# Patient Record
Sex: MALE
[De-identification: names, ages, dates, MRNs, and addresses within clinical notes are randomized per-mention and may not be internally consistent; named-entity substitution may affect disease eponyms.]

---

## 2020-06-08 ENCOUNTER — HOSPITAL ENCOUNTER (EMERGENCY)
Dept: HOSPITAL 5 - ED | Age: 50
Discharge: ANOTHER HOSPITAL | End: 2020-06-08
Payer: COMMERCIAL

## 2020-06-08 VITALS — SYSTOLIC BLOOD PRESSURE: 160 MMHG | DIASTOLIC BLOOD PRESSURE: 64 MMHG

## 2020-06-08 DIAGNOSIS — F17.200: ICD-10-CM

## 2020-06-08 DIAGNOSIS — I50.9: Primary | ICD-10-CM

## 2020-06-08 DIAGNOSIS — Z98.890: ICD-10-CM

## 2020-06-08 DIAGNOSIS — Z95.818: ICD-10-CM

## 2020-06-08 LAB
BASOPHILS # (AUTO): 0.1 K/MM3 (ref 0–0.1)
BASOPHILS NFR BLD AUTO: 1 % (ref 0–1.8)
BUN SERPL-MCNC: 12 MG/DL (ref 9–20)
BUN/CREAT SERPL: 12 %
CALCIUM SERPL-MCNC: 9.9 MG/DL (ref 8.4–10.2)
EOSINOPHIL # BLD AUTO: 0.1 K/MM3 (ref 0–0.4)
EOSINOPHIL NFR BLD AUTO: 1.1 % (ref 0–4.3)
HCT VFR BLD CALC: 46.3 % (ref 35.5–45.6)
HEMOLYSIS INDEX: 11
HGB BLD-MCNC: 14 GM/DL (ref 11.8–15.2)
INR PPP: 0.8 (ref 0.87–1.13)
LYMPHOCYTES # BLD AUTO: 1.5 K/MM3 (ref 1.2–5.4)
LYMPHOCYTES NFR BLD AUTO: 16.3 % (ref 13.4–35)
MCHC RBC AUTO-ENTMCNC: 30 % (ref 32–34)
MCV RBC AUTO: 64 FL (ref 84–94)
MONOCYTES # (AUTO): 0.8 K/MM3 (ref 0–0.8)
MONOCYTES % (AUTO): 9 % (ref 0–7.3)
PLATELET # BLD: 303 K/MM3 (ref 140–440)
RBC # BLD AUTO: 7.19 M/MM3 (ref 3.65–5.03)

## 2020-06-08 PROCEDURE — 96374 THER/PROPH/DIAG INJ IV PUSH: CPT

## 2020-06-08 PROCEDURE — 36415 COLL VENOUS BLD VENIPUNCTURE: CPT

## 2020-06-08 PROCEDURE — 93005 ELECTROCARDIOGRAM TRACING: CPT

## 2020-06-08 PROCEDURE — 96375 TX/PRO/DX INJ NEW DRUG ADDON: CPT

## 2020-06-08 PROCEDURE — 85610 PROTHROMBIN TIME: CPT

## 2020-06-08 PROCEDURE — 80048 BASIC METABOLIC PNL TOTAL CA: CPT

## 2020-06-08 PROCEDURE — 70450 CT HEAD/BRAIN W/O DYE: CPT

## 2020-06-08 PROCEDURE — 71045 X-RAY EXAM CHEST 1 VIEW: CPT

## 2020-06-08 PROCEDURE — 85025 COMPLETE CBC W/AUTO DIFF WBC: CPT

## 2020-06-08 PROCEDURE — 83735 ASSAY OF MAGNESIUM: CPT

## 2020-06-08 PROCEDURE — 83880 ASSAY OF NATRIURETIC PEPTIDE: CPT

## 2020-06-08 PROCEDURE — 82550 ASSAY OF CK (CPK): CPT

## 2020-06-08 PROCEDURE — 84484 ASSAY OF TROPONIN QUANT: CPT

## 2020-06-08 PROCEDURE — 99291 CRITICAL CARE FIRST HOUR: CPT

## 2020-06-08 NOTE — EMERGENCY DEPARTMENT REPORT
ED Chest Pain HPI





- General


Chief Complaint: Chest Pain


Stated Complaint: CHEST PAIN


PUI?: No


Time Seen by Provider: 20 10:41


Source: patient, RN notes reviewed


Mode of arrival: Stretcher


Limitations: Physical Limitation





- History of Present Illness


Initial Comments: 





Patient is a 49-year-old gentleman.  He is not known to myself previously.  He 

appears to have a history of obesity, hypertension, high cholesterol, he is c

urrently on Plavix, for unclear reasons, and also on Pradaxa, for unclear 

reasons.





He typically follows with the [facility].





He presents to the ER with a complaint of possible syncope/near syncope, and 

chest pressure/discomfort.





Patient states he has been compliant with his medications, he was in his usual 

state of health earlier on today, when he believes that he fell, or "fell out", 

he is not certain, denies headache and neck pain, describes nonspecific chest 

discomfort, denies DVT and pulmonary embolism risk factors, and has chronic 

lower extremity swelling.  He denies fever, loss of taste, loss of smell, and 

COVID symptomatology/COVID exposure.





He does take sildenifial, he believes that he took it a few weeks ago, but he is

not certain.





Apparently, emergency medical services were contacted, and they gave the patient

aspirin, and nitroglycerin.





He has significant anxiety, but he is basically having minimal pain at this 

time, rating his pain as a 3 out of 10 at this point in time.








MD Complaint: chest pain


-: Sudden, hour(s)


Onset: during exertion


Pain Location: substernal, left chest, right chest


Pain Radiation: none


Severity: moderate


Quality: other (Patient does not have a qualitative descriptor)


Consistency: now resolved (Chest discomfort is mostly resolved)


Improves With: nitroglycerin, rest


Worsens With: nothing (Nothing that the patient can recall)


Context: other (Patient was working on a lawnmower)


Treatments Prior to Arrival: aspirin, nitroglycerin


Aspirin use within the Past 7 Days: (0) No





- Related Data


On Oral Contraceptives: No


                                Home Medications











 Medication  Instructions  Recorded  Confirmed  Last Taken


 


AtorvaSTATin [Lipitor] 40 mg PO QHS 20 Unknown


 


Clopidogrel [Plavix] 75 mg PO QDAY 20 Unknown


 


Dabigatran Etexilate Mesylate 150 mg PO QDAY 20 Unknown





[Pradaxa]    


 


Famotidine [Pepcid] 20 mg PO BID 20 Unknown


 


Gabapentin [Neurontin] 300 mg PO QDAY 20 Unknown


 


Losartan [Cozaar] 25 mg PO QDAY 20 Unknown


 


Potassium Chloride [K-Dur] 10 meq PO QDAY 20 Unknown


 


Sertraline [Zoloft] 50 mg PO QDAY 20 Unknown


 


Sildenafil [Revatio] 20 mg PO QDAY 20 Unknown


 


Tamsulosin [Flomax] 0.4 mg PO QDAY 20 Unknown


 


Torsemide [Demadex] 20 mg PO BID 20 Unknown


 


amLODIPine [Norvasc] 5 mg PO DAILY 20 Unknown











                                    Allergies











Allergy/AdvReac Type Severity Reaction Status Date / Time


 


No Known Allergies Allergy   Unverified 20 10:37














Heart Score





- HEART Score


History: Moderately suspicious


EKG: Non-specific


Age: 45-65


Risk factors: > 3 risk factors or hx of atherosclerotic disease


Troponin: < normal limit


HEART Score: 5





- Critical Actions


Critical Actions: 4-6 pts:12-16.6% risk of adverse cardiac event. Should be 

admitted





ED Review of Systems


ROS: 


Stated complaint: CHEST PAIN


Other details as noted in HPI





Constitutional: malaise, weakness.  denies: fever


Eyes: denies: eye discharge


Respiratory: denies: cough


Cardiovascular: chest pain, edema, other


Gastrointestinal: denies: nausea, vomiting, diarrhea


Musculoskeletal: back pain (Patient reports chronic back)


Skin: denies: lesions


Neurological: denies: headache


Psychiatric: anxiety





ED Past Medical Hx





- Past Medical History


Previous Medical History?: Yes


Hx Congestive Heart Failure: Yes





- Surgical History


Hx Coronary Stent: Yes (x1)


Additional Surgical History: Brain aneruysm





- Social History


Smoking Status: Current Every Day Smoker


Substance Use Type: None





- Medications


Home Medications: 


                                Home Medications











 Medication  Instructions  Recorded  Confirmed  Last Taken  Type


 


AtorvaSTATin [Lipitor] 40 mg PO QHS 20 Unknown History


 


Clopidogrel [Plavix] 75 mg PO QDAY 20 Unknown History


 


Dabigatran Etexilate Mesylate 150 mg PO QDAY 20 Unknown History





[Pradaxa]     


 


Famotidine [Pepcid] 20 mg PO BID 20 Unknown History


 


Gabapentin [Neurontin] 300 mg PO QDAY 20 Unknown History


 


Losartan [Cozaar] 25 mg PO QDAY 20 Unknown History


 


Potassium Chloride [K-Dur] 10 meq PO QDAY 20 Unknown History


 


Sertraline [Zoloft] 50 mg PO QDAY 20 Unknown History


 


Sildenafil [Revatio] 20 mg PO QDAY 20 Unknown History


 


Tamsulosin [Flomax] 0.4 mg PO QDAY 20 Unknown History


 


Torsemide [Demadex] 20 mg PO BID 20 Unknown History


 


amLODIPine [Norvasc] 5 mg PO DAILY 20 Unknown History














ED Physical Exam





- General


Limitations: No Limitations


General appearance: alert, anxious, obese





- Head


Head exam: Present: atraumatic, normocephalic





- Eye


Eye exam: Present: normal appearance, EOMI.  Absent: nystagmus





- ENT


ENT exam: Present: normal exam, normal orophraynx, mucous membranes moist, 

normal external ear exam





- Neck


Neck exam: Present: normal inspection, full ROM.  Absent: tenderness, 

meningismus





- Respiratory


Respiratory exam: Present: rales, accessory muscle use.  Absent: respiratory 

distress, wheezes, rhonchi, stridor





- Cardiovascular


Cardiovascular Exam: Present: regular rate, normal rhythm, normal heart sounds, 

JVD.  Absent: bradycardia, tachycardia, irregular rhythm, systolic murmur, 

diastolic murmur, rubs, gallop





- GI/Abdominal


GI/Abdominal exam: Present: soft.  Absent: distended, tenderness, guarding, 

rebound, rigid, pulsatile mass





- Rectal


Rectal exam: Present: deferred





- Extremities Exam


Extremities exam: Present: normal inspection, full ROM, pedal edema (3+ edema 

noted on the bilateral lower extremity), other (2+ pulses noted in the bilateral

 upper and lower extremities.  There is no palpable cord.   negative Homans 

sign.  Muscular compartments are soft.  The pelvis is stable.).  Absent: calf 

tenderness





- Back Exam


Back exam: Present: normal inspection.  Absent: tenderness, CVA tenderness (R), 

CVA tenderness (L), paraspinal tenderness, vertebral tenderness





- Neurological Exam


Neurological exam: Present: alert, other (No facial droop.  Tongue midline.  

Extraocular movements intact bilaterally.  Facial sensation intact to light 

touch in V1, V2, V3 distribution bilaterally.  5 and a 5 strength in 4 

extremities.  Sensation intact to light touch in 4 extremities.).  Absent: motor

 sensory deficit





- Psychiatric


Psychiatric exam: Present: anxious





- Skin


Skin exam: Present: warm, dry, intact, normal color.  Absent: rash





ED Course


                                   Vital Signs











  20





  10:37 10:49 10:51


 


Temperature 98.3 F  


 


Pulse Rate  97 H 91 H


 


Respiratory  13 9 L





Rate   


 


Blood Pressure   144/89


 


O2 Sat by Pulse   96





Oximetry   














  20





  10:53 10:55 10:56


 


Temperature   


 


Pulse Rate 94 H 90 


 


Respiratory 13 20 12





Rate   


 


Blood Pressure 144/89 144/89 


 


O2 Sat by Pulse 96 93 92





Oximetry   














  20





  11:00 11:30 12:01


 


Temperature   


 


Pulse Rate 86 94 H 89


 


Respiratory 12 13 16





Rate   


 


Blood Pressure 158/74 174/96 173/50


 


O2 Sat by Pulse 95 94 96





Oximetry   














  20





  12:33 12:35 12:36


 


Temperature   


 


Pulse Rate 91 H  


 


Respiratory 12 13 13





Rate   


 


Blood Pressure 174/96  


 


O2 Sat by Pulse   





Oximetry   














- Reevaluation(s)


Reevaluation #1: 





20 12:12


Differential diagnosis, including but not limited to: Orthostasis, vagal event, 

structural cardiac disease, arrhythmia, acute coronary syndrome, congestive 

heart failure





Assessment and plan: 49-year-old gentleman, who is not currently tachycardic, 

with crackles, rales, hypoxia, lower extremity edema, my interpretation of the 

chest x-ray is consistent with congestive heart failure, I disagree with 

radiology interpretation.





Had nonspecific chest discomfort, and possible syncope/loss of consciousness.  

There is no headache or neck pain, and he has a GCS of 15.  Do not suspect acute

 aortic dissection at this time, given equal pulses in the upper and lower 

extremities,


An x-ray of the chest morphology.  His pain is minimal at this time.  

Nitroglycerin will be withheld given that we do not know if/when the patient has

 recently taken his phosphodiesterase inhibitor.  He has already been given 

aspirin.








He is given Lasix for acute volume overload.





I contacted [facility] coordinating physician, Dr. Marge Calero; [phone], and 

discussed the patient's history, physical, laboratory studies and clinical 

impression, and have requested emergent transfer to 1 of the [facility] receiving 

facilities, as the patient has endorses a desire to be transferred.  Dr. Calero 

has requested a repeat troponin, and requested that the patient's pain be 

treated.  I advised Dr. Calero that I would consider his current clinical status

 medically suitable for transfer for further evaluation and management, but we 

will give a small dose of morphine for additional pain control.  Patient did 

give consent for his case to be discussed with his mother.





His mother is at the bedside.





Patient at moderate risk for major adverse cardiac event as per current heart 

score.





We are awaiting repeat troponin, repeat EKG, Dr. Calero has asked that I call 

her back once the initial troponin has resulted.  The patient has requested to 

be transferred to [facility], Dr. Calero advises that patient because of his 

underlying insurance will need to go to [facility]


Reevaluation #2: 





20 13:26


Dr Armstrong to accept to [facility] --> rca intervention, afib, mca aneurysm, sleep apnea








ct head


negative to my interpretation.





Troponin negative x2.





Dr. Calero updated.





Family updated as well.





DANILO score





- Danilo Score


Age > 65: (0) No


Aspirin use within the Past 7 Days: (0) No


3 or more CAD Risk Factors: (1) Yes


2 or more Angina events in past 24 hrs: (1) Yes


Known CAD with more than 50% Stenosis: (0) No


Elevated Cardiac Markers: (0) No


ST Deviation Greater than 0.5mm: (0) No


DANILO Score: 2





ED Medical Decision Making





- Lab Data


Result diagrams: 


                                 20 10:45





                                 20 10:45








                                   Vital Signs











  20





  10:37 10:49 10:51


 


Temperature 98.3 F  


 


Pulse Rate  97 H 91 H


 


Respiratory  13 9 L





Rate   


 


Blood Pressure   144/89


 


O2 Sat by Pulse   96





Oximetry   














  20





  10:53 10:55 10:56


 


Temperature   


 


Pulse Rate 94 H 90 


 


Respiratory 13 20 12





Rate   


 


Blood Pressure 144/89 144/89 


 


O2 Sat by Pulse 96 93 92





Oximetry   














  20





  11:00 11:30


 


Temperature  


 


Pulse Rate 86 94 H


 


Respiratory 12 13





Rate  


 


Blood Pressure 158/74 174/96


 


O2 Sat by Pulse 95 94





Oximetry  











                                   Lab Results











  20 Range/Units





  10:45 10:45 10:45 


 


WBC  8.9    (4.5-11.0)  K/mm3


 


RBC  7.19 H    (3.65-5.03)  M/mm3


 


Hgb  14.0    (11.8-15.2)  gm/dl


 


Hct  46.3 H    (35.5-45.6)  %


 


MCV  64 L    (84-94)  fl


 


MCH  20 L    (28-32)  pg


 


MCHC  30 L    (32-34)  %


 


RDW  23.2 H    (13.2-15.2)  %


 


Plt Count  303    (140-440)  K/mm3


 


Lymph % (Auto)  16.3    (13.4-35.0)  %


 


Mono % (Auto)  9.0 H    (0.0-7.3)  %


 


Eos % (Auto)  1.1    (0.0-4.3)  %


 


Baso % (Auto)  1.0    (0.0-1.8)  %


 


Lymph #  1.5    (1.2-5.4)  K/mm3


 


Mono #  0.8    (0.0-0.8)  K/mm3


 


Eos #  0.1    (0.0-0.4)  K/mm3


 


Baso #  0.1    (0.0-0.1)  K/mm3


 


Seg Neutrophils %  72.0 H    (40.0-70.0)  %


 


Seg Neutrophils #  6.6    (1.8-7.7)  K/mm3


 


PT    10.9 L  (12.2-14.9)  Sec.


 


INR    0.80 L  (0.87-1.13)  


 


Sodium   141   (137-145)  mmol/L


 


Potassium   4.5   (3.6-5.0)  mmol/L


 


Chloride   98.9   ()  mmol/L


 


Carbon Dioxide   31 H   (22-30)  mmol/L


 


Anion Gap   16   mmol/L


 


BUN   12   (9-20)  mg/dL


 


Creatinine   1.0   (0.8-1.5)  mg/dL


 


Estimated GFR   > 60   ml/min


 


BUN/Creatinine Ratio   12   %


 


Glucose   87   ()  mg/dL


 


Calcium   9.9   (8.4-10.2)  mg/dL


 


Magnesium     (1.7-2.3)  mg/dL


 


Total Creatine Kinase     ()  units/L


 


Troponin T   < 0.010   (0.00-0.029)  ng/mL


 


NT-Pro-B Natriuret Pep     (0-450)  pg/mL














  20 Range/Units





  10:45 10:45 


 


WBC    (4.5-11.0)  K/mm3


 


RBC    (3.65-5.03)  M/mm3


 


Hgb    (11.8-15.2)  gm/dl


 


Hct    (35.5-45.6)  %


 


MCV    (84-94)  fl


 


MCH    (28-32)  pg


 


MCHC    (32-34)  %


 


RDW    (13.2-15.2)  %


 


Plt Count    (140-440)  K/mm3


 


Lymph % (Auto)    (13.4-35.0)  %


 


Mono % (Auto)    (0.0-7.3)  %


 


Eos % (Auto)    (0.0-4.3)  %


 


Baso % (Auto)    (0.0-1.8)  %


 


Lymph #    (1.2-5.4)  K/mm3


 


Mono #    (0.0-0.8)  K/mm3


 


Eos #    (0.0-0.4)  K/mm3


 


Baso #    (0.0-0.1)  K/mm3


 


Seg Neutrophils %    (40.0-70.0)  %


 


Seg Neutrophils #    (1.8-7.7)  K/mm3


 


PT    (12.2-14.9)  Sec.


 


INR    (0.87-1.13)  


 


Sodium    (137-145)  mmol/L


 


Potassium    (3.6-5.0)  mmol/L


 


Chloride    ()  mmol/L


 


Carbon Dioxide    (22-30)  mmol/L


 


Anion Gap    mmol/L


 


BUN    (9-20)  mg/dL


 


Creatinine    (0.8-1.5)  mg/dL


 


Estimated GFR    ml/min


 


BUN/Creatinine Ratio    %


 


Glucose    ()  mg/dL


 


Calcium    (8.4-10.2)  mg/dL


 


Magnesium  2.30   (1.7-2.3)  mg/dL


 


Total Creatine Kinase  354 H   ()  units/L


 


Troponin T    (0.00-0.029)  ng/mL


 


NT-Pro-B Natriuret Pep   98.91  (0-450)  pg/mL














- EKG Data


-: EKG Interpreted by Me


EKG shows normal: sinus rhythm


Rate: normal





- EKG Data


When compared to previous EKG there are: previous EKG unavailable





20 11:58


There is no prior EKG available for comparison.  Sinus rhythm, 94 bpm, normal 

axis, normal intervals, high left ventricular voltage, the EKG is not a STEMI, 

premature atrial complex.  No prior for comparison.





- Radiology Data


Radiology results: report reviewed, image reviewed





Print Report











Referring Physician: GURINDER SU 


 


Patient Name: KARINE MORGAN 


 


Patient ID: [id number] 


 


YOB: 1970 


 


Sex: Male 


 


Accession: [id number] 


 


Report Date: 2020 


 


Report Status: Finalized 


 


Findings


[facility], [address] [place] [address] 

XRay Report Signed Patient: KARINE MORGAN MR#: [id number] : 

1970 Acct:[id number] Age/Sex: 49 / M ADM Date: 20 Loc: ED 

Attending Dr: Ordering Physician: GURINDER SU MD Date of Service: 20

 Procedure(s): XR chest 1V ap Accession Number(s): [id number] cc: GURINDER SU MD Fluoro Time In Minutes: CHEST 1 VIEW INDICATION / CLINICAL INFORMATION: 

acute chnest pain. COMPARISON: None available. FINDINGS: SUPPORT DEVICES: None. 

HEART / MEDIASTINUM: No significant abnormality. LUNGS / PLEURA: No significant 

pulmonary or pleural abnormality. No pneumothorax. ADDITIONAL FINDINGS: No sig

nificant additional findings. IMPRESSION: 1. No significant change Signer Name: 

Jesu Gutiérrez MD Signed: 2020 11:43 AM Workstation Name: [facility] 

Transcribed By: CAROLYN Dictated By: Jesu Gutiérrez MD Electronically 

Authenticated By: Jesu Gutiérrez MD Signed Date/Time: 20 1143 DD/DT: 

20 1142 TD/TT:   











Critical Care Time: Yes


Critical care time in (mins) excluding proc time.: 35


Critical care attestation.: 


If time is entered above; I have spent that time in minutes in the direct care 

of this critically ill patient, excluding procedure time.








ED Disposition


Clinical Impression: 


 Acute chest pain, Acute CHF, Anticoagulated, History of syncope





Disposition: DC/TX-02 [facility] HOS[facility] IP


Is pt being admited?: No


Does the pt Need Aspirin: No (Patient given aspirin by EMS)


Condition: Stable


Instructions:  Chest Pain (ED)


Referrals: 


PRIMARY CARE,MD [Primary Care Provider] - 3-5 Days